# Patient Record
Sex: MALE | Race: WHITE | NOT HISPANIC OR LATINO
[De-identification: names, ages, dates, MRNs, and addresses within clinical notes are randomized per-mention and may not be internally consistent; named-entity substitution may affect disease eponyms.]

---

## 2021-11-30 ENCOUNTER — APPOINTMENT (OUTPATIENT)
Dept: PULMONOLOGY | Facility: CLINIC | Age: 36
End: 2021-11-30
Payer: COMMERCIAL

## 2021-11-30 VITALS
HEART RATE: 75 BPM | DIASTOLIC BLOOD PRESSURE: 75 MMHG | HEIGHT: 72 IN | WEIGHT: 230 LBS | SYSTOLIC BLOOD PRESSURE: 120 MMHG | OXYGEN SATURATION: 98 % | BODY MASS INDEX: 31.15 KG/M2

## 2021-11-30 DIAGNOSIS — Z78.9 OTHER SPECIFIED HEALTH STATUS: ICD-10-CM

## 2021-11-30 DIAGNOSIS — R06.02 SHORTNESS OF BREATH: ICD-10-CM

## 2021-11-30 PROBLEM — Z00.00 ENCOUNTER FOR PREVENTIVE HEALTH EXAMINATION: Status: ACTIVE | Noted: 2021-11-30

## 2021-11-30 PROCEDURE — 99204 OFFICE O/P NEW MOD 45 MIN: CPT

## 2021-11-30 RX ORDER — PANTOPRAZOLE 40 MG/1
40 TABLET, DELAYED RELEASE ORAL DAILY
Qty: 30 | Refills: 0 | Status: ACTIVE | COMMUNITY
Start: 2021-11-30 | End: 1900-01-01

## 2021-11-30 NOTE — HISTORY OF PRESENT ILLNESS
[FreeTextEntry1] : no pmd\par 36 year old   with history of covid (march 2020 was a mild disease)  is here in the sleep center to address shortness of breath at night. Patient says he is not able to breath as soon as he lies down at night and coughs up phlegm at night.  Patient is not that sleepy with [place] sleepiness score of 9.  Patient has some snoring, does not have any witnessed apneas.  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  He feels tired when he wakes up.  Patient drinks 4-5 cups of coffee during the daytime. Patient does not have any headaches or nocturia.  He is not sleepy while driving.\par STOPBANG score - 2\par Patient denies any chest pain, shortness of breath during the day, chronic cough.\par \par He claims as soon as he lies down at night that is when the symptoms start where he feels that he is not able to breathe and then coughs up a big mucous plug.\par

## 2021-11-30 NOTE — ASSESSMENT
[FreeTextEntry1] : 36-year-old man comes in with symptoms of shortness of breath especially at night, and coughing up phlegm as he lies down at night.  One of the things in the differential is acid reflux.\par \par I asked him to cut down the coffee he drinks about 4 to 5 cups of coffee a day, will also give a trial of Protonix for a month and see if the symptoms improve.\par \par Other things in the differential is post Covid related inflammation in the lungs, bronchial asthma.  Based on his anatomy sleep apnea is also in the differential.\par \par We will do a chest x-ray, pulmonary function tests with methacholine challenge test and sleep study as a part of work-up.

## 2022-01-18 ENCOUNTER — APPOINTMENT (OUTPATIENT)
Dept: PULMONOLOGY | Facility: CLINIC | Age: 37
End: 2022-01-18